# Patient Record
Sex: FEMALE | Race: BLACK OR AFRICAN AMERICAN
[De-identification: names, ages, dates, MRNs, and addresses within clinical notes are randomized per-mention and may not be internally consistent; named-entity substitution may affect disease eponyms.]

---

## 2017-04-15 NOTE — ER DOCUMENT REPORT
ED Medical Screen (RME)





- General


Chief Complaint: Chest Pain


Stated Complaint: CHEST PAIN


Notes: 


Patient is experiencing chest pain that began about 3 PM she was getting off 

from work today.  She says that the pain is easing off and now it only comes 

and goes.  She's never had anything like this before.  Feels like she has some 

acid in her chest.  It did not take any antacid, Tums, etc.  On no medications 

for acid reflux.  Eyes history of heart disease.  Denies shortness of breath.  

No fever or chills, but patient says she did have sweats at times this evening.





PMH: Hypertension, smokes.


TRAVEL OUTSIDE OF THE [place] IN LAST 30 DAYS: No





- Related Data


Allergies/Adverse Reactions: 


 





Penicillins Allergy (Intermediate, Verified 12/25/11 16:50)


 


cephalexin monohydrate [From Keflex] Adverse Reaction (Intermediate, Verified 12 /25/11 16:50)


 


ciprofloxacin [From Cipro] Adverse Reaction (Intermediate, Verified 12/25/11 16:

50)


 


ciprofloxacin HCl [From Cipro] Adverse Reaction (Intermediate, Verified 12/25/ 11 16:50)


 


Sulfa (Sulfonamide Antibiotics) Adverse Reaction (Intermediate, Verified 12/25/ 11 16:50)


 











Past Medical History





- Past Medical History


Cardiac Medical History: Reports: Hx Hypercholesterolemia, Hx Hypertension


Pulmonary Medical History: Reports: Hx Asthma, Hx Bronchitis


Renal/ Medical History: Denies: Hx Peritoneal Dialysis


Past Surgical History: Reports: Hx Tubal Ligation





- Immunizations


Hx Diphtheria, Pertussis, Tetanus Vaccination: Yes





Physical Exam





- Vital signs


Vitals: 





 











Temp Pulse Resp BP Pulse Ox


 


 98.4 F   74   16   174/93 H  96 


 


 04/15/17 18:27  04/15/17 18:27  04/15/17 18:27  04/15/17 18:27  04/15/17 18:27














Course





- Vital Signs


Vital signs: 





 











Temp Pulse Resp BP Pulse Ox


 


 98.4 F   74   16   174/93 H  96 


 


 04/15/17 18:27  04/15/17 18:27  04/15/17 18:27  04/15/17 18:27  04/15/17 18:27

## 2017-04-15 NOTE — ER DOCUMENT REPORT
ED Cardiac





- General


Chief Complaint: Chest Pain


Stated Complaint: CHEST PAIN


Notes: 


The patient is a 57-year-old female, past medical history hypertension, 

presents after 20 minutes of right lower substernal chest pain that started at 

rest.  She has never had symptoms like this in the past.  The symptoms resolved 

without any intervention.  Denies shortness of breath, back pain, nausea, 

vomiting, leg swelling, abdominal pain, cough, fevers or hemoptysis.


TRAVEL OUTSIDE OF THE [place] IN LAST 30 DAYS: No





- Related Data


Allergies/Adverse Reactions: 


 





Penicillins Allergy (Intermediate, Verified 12/25/11 16:50)


 


cephalexin monohydrate [From Keflex] Adverse Reaction (Intermediate, Verified 12 /25/11 16:50)


 


ciprofloxacin [From Cipro] Adverse Reaction (Intermediate, Verified 12/25/11 16:

50)


 


ciprofloxacin HCl [From Cipro] Adverse Reaction (Intermediate, Verified 12/25/ 11 16:50)


 


Sulfa (Sulfonamide Antibiotics) Adverse Reaction (Intermediate, Verified 12/25/ 11 16:50)


 











Past Medical History





- General


Information source: Patient





- Social History


Smoking Status: Unknown if Ever Smoked


Family History: Reviewed & Not Pertinent





- Past Medical History


Cardiac Medical History: Reports: Hx Hypercholesterolemia, Hx Hypertension


Pulmonary Medical History: Reports: Hx Asthma, Hx Bronchitis


Renal/ Medical History: Denies: Hx Peritoneal Dialysis


Past Surgical History: Reports: Hx Tubal Ligation





- Immunizations


Hx Diphtheria, Pertussis, Tetanus Vaccination: Yes





Review of Systems





- Review of Systems


Notes: 


REVIEW OF SYSTEMS:


CONSTITUTIONAL: -fevers, -chills


EENT: -eye pain, -difficulty swallowing, -nasal congestion


CARDIOVASCULAR: +chest pain, -syncope.


RESPIRATORY: -cough, -SOB


GASTROINTESTINAL: -abdominal pain, - nausea, -vomiting, -diarrhea


GENITOURINARY: -dysuria, -hematuria


MUSCULOSKELETAL: -back pain, -neck pain


SKIN: -rash or skin lesions.


HEMATOLOGIC: -easy bruising or bleeding.


LYMPHATIC: -swollen, enlarged glands.


NEUROLOGICAL: -altered mental status or loss of consciousness, -headache, -

neurologic symptoms


PSYCHIATRIC: -anxiety, -depression.


ALL OTHER SYSTEMS REVIEWED AND NEGATIVE.





Physical Exam





- Vital signs


Vitals: 


 











Temp Pulse Resp BP Pulse Ox


 


 98.4 F   74   16   174/93 H  96 


 


 04/15/17 18:27  04/15/17 18:27  04/15/17 18:27  04/15/17 18:27  04/15/17 18:27














- Notes


Notes: 


PHYSICAL EXAMINATION:





GENERAL: Well-appearing, well-nourished and in no acute distress.





HEAD: Atraumatic, normocephalic.





EYES: Pupils equal round and reactive to light, extraocular movements intact, 

sclera anicteric, conjunctiva are normal.





ENT: nares patent, oropharynx clear without exudates.  Moist mucous membranes.





NECK: Normal range of motion, supple without lymphadenopathy





LUNGS: Breath sounds clear to auscultation bilaterally and equal.  No wheezes 

rales or rhonchi.





HEART: Regular rate and rhythm without murmurs





ABDOMEN: Soft, nontender, normoactive bowel sounds.  No guarding, no rebound.  

No masses appreciated.





EXTREMITIES: Normal range of motion, no pitting or edema.  No cyanosis.





NEUROLOGICAL: Cranial nerves grossly intact.  Normal speech, normal gait.  

Normal sensory, motor, and reflex exams.





PSYCH: Normal mood, normal affect.





SKIN: Warm, Dry, normal turgor, no rashes or lesions noted.





Course





- Re-evaluation


Re-evalutation: 


Pt has HEART score 3. Two sets of troponins are negative.  Symptoms atypical 

for PE or aortic dissection at this time.  Patient's chest pain quickly 

resolved without any intervention.  Told her that she must follow-up with her 

primary care physician tomorrow for further evaluation and treatment of her 

chest pain.  Given strict return precautions and she understands.





- Vital Signs


Vital signs: 


 











Temp Pulse Resp BP Pulse Ox


 


 98.4 F   74   17   139/77 H  100 


 


 04/15/17 18:27  04/15/17 18:27  04/15/17 22:00  04/15/17 21:01  04/15/17 22:00














- Laboratory


Result Diagrams: 


 04/15/17 19:05





 04/15/17 19:05


Laboratory results interpreted by me: 


 











  04/15/17 04/15/17





  19:05 19:05


 


Seg Neuts % (Manual)  36 L 


 


Lymphocytes % (Manual)  48 H 


 


Abs Lymphs (Manual)  5.3 H 


 


Potassium   3.5 L


 


Calcium   10.6 H


 


Creatine Kinase   189 H














- Diagnostic Test


Radiology reviewed: Image reviewed, Reports reviewed





- EKG Interpretation by Me


EKG shows normal: Sinus rhythm, Axis, Intervals, QRS Complexes, ST-T Waves


Rate: Normal





Discharge





- Discharge


Clinical Impression: 


Chest pain


Qualifiers:


 Chest pain type: unspecified Qualified Code(s): R07.9 - Chest pain, unspecified





Condition: Stable


Disposition: HOME, SELF-CARE


Additional Instructions: 


CHEST PAIN OF UNCLEAR CAUSE:





     The exact cause of your chest pain isn't clear.  Fortunately, there is no 

evidence of a dangerous medical condition.  Further testing may be required to 

find the source of the pain.


     Most often, we find that this pain is coming from the chest wall -- the 

muscles or rib joints in the chest.  But chest pain can come from the lung and 

lung lining, the esophagus, the heart valves or heart lining, and even the 

stomach or gallbladder.


     Rest.  Eat lightly until the pain is gone.  We may prescribe medicine for 

pain and inflammation.


     You should call the physician immediately if the pain radiates to the 

shoulder, jaw or arms; if you start to run a fever or develop a cough; or if 

you develop shortness of breath, or other new or alarming symptoms.








NORMAL EXAM AND WORKUP:


     At this time, your examination and workup show no significant abnormality.

  No significant abnormal physical findings were noted.  All laboratory, EKG, 

and imaging (x-ray, CT scans, ultrasound) studies that were ordered show no 

significant abnormality.


     Although your examination and all studies that were ordered showed no 

significant abnormal finding, there are no examinations and no studies that are 

100% accurate.  There is always the possibility that some abnormality could 

exist and not be detected with physical examination or within the limits and 

capabilities of laboratory and other studies.


     You should return or follow up as you were instructed on your visit today 

for further evaluation if your symptoms do not resolve.








CHEST WALL PAIN:


     Your chest pain may be coming from the chest wall. This is often caused by 

straining the muscles or joints in the chest during physical activity, direct 

trauma, coughing, or vigorous vomiting.  Persons with arthritis are especially 

prone to this type of pain, due to inflammation of the cartilage joints near 

the breast bone. Occasionally, no cause can be found.


     Rest from strenuous physical activity.  This kind of chest pain is usually 

made worse by movement of the chest.  Depending on the symptoms, we may 

prescribe medicine for pain, muscle relaxation, and antiinflammatory effects.


     If the pain is new, and seems to be due to muscle strain, cold packs can 

help.  Otherwise, apply gentle warmth to the painful area for 15 minutes every 

hour or two.


     You should call contact the doctor immediately if things change. Further 

evaluation is needed if you develop a fever or cough, if the nature of the pain 

changes, or if you become short of breath.








ANGINA EPISODE:


     Your physician has diagnosed the pain you experienced as an episode of 

angina.  Angina occurs when a portion of the heart muscle temporarily lacks 

oxygen.  It does not cause any permanent heart damage, but serves as a warning.


     Hospitalization is not necessary now.  Evaluation of your cardiac condition

, and medical therapy for angina will be necessary.  It's important you be sure 

to keep all appointments and take medication exactly as prescribed.


     Angina is usually treated with a type of "nitrate" medication. This is 

available as ointment, pills, or sublingual (under the tongue) tablets.  

Depending on your clinical situation, other medications may be added to help 

control angina.  These may include beta blockers or calcium blockers.


     If episodes of angina are occurring with increased frequency, or if chest 

pain lasts longer than 15 minutes or does not respond to nitroglycerin, you 

must seek emergency medical care immediately.








ACID REFLUX DISEASE (GERD):


     Gastro-Esophageal Reflux Disease (GERD) is caused by stomach acid 

refluxing back up into the esophagus. The valve at the end of the esophagus may 

be weak. This is common in persons with a hiatal hernia. GERD symptoms can 

include indigestion, chest pain, heartburn, or food "sticking." Certain foods, 

alcohol, and aspirin can make GERD worse.


     Treatment depends on the severity. Usually, antacids or acid-suppressing 

medicines are used. When the esophagus is acutely inflamed, the physician will 

often prescribe membrane-protective drugs such as Carafate.  Some patients 

benefit from medication such as Reglan that tightens the valve at the top of 

the stomach.


     Avoid those foods that bring on your symptoms. For many people, these 

foods are coffee, chocolate, onions, garlic, and carbonated drinks. Don't use 

alcohol, aspirin, caffeine, or tobacco. Don't eat late at night -- within 4 

hours of bedtime. Don't over-eat. If necessary, elevate the head of your bed 

about 4 inches so that stomach acid will not roll up into your esophagus.


     Call the doctor if you develop severe chest pain, inability to swallow 

fluids, fever, or worsening symptoms.








FOLLOW-UP CARE:


If you have been referred to a physician for follow-up care, call the physician

s office for an appointment as you were instructed or within the next two days.

  If you experience worsening or a significant change in your symptoms, notify 

the physician immediately or return to the Emergency Department at any time for 

re-evaluation.


Referrals: 


REESE ZAFAR MD [[facility]] - Follow up as needed

## 2018-06-22 NOTE — RADIOLOGY REPORT (SQ)
EXAM DESCRIPTION:  CHEST PA/LATERAL



COMPLETED DATE/TIME:  6/22/2018 12:04 pm



REASON FOR STUDY:  R06.2 Wheezing; J45.20 Mild intermittent asthma, uncomplicated



COMPARISON:  4/15/2017.



EXAM PARAMETERS:  NUMBER OF VIEWS: two views

TECHNIQUE: Digital Frontal and Lateral radiographic views of the chest acquired.

RADIATION DOSE: NA

LIMITATIONS: none



FINDINGS:  LUNGS AND PLEURA: No acute infiltrates or effusions.

MEDIASTINUM AND HILAR STRUCTURES: No masses or contour abnormalities.

HEART AND VASCULAR STRUCTURES: The heart is normal.  The pulmonary vasculature is normal.

BONES: No acute findings.

HARDWARE: None in the chest.

OTHER: No other significant finding.



IMPRESSION:  NO ACUTE DISEASE.



TECHNICAL DOCUMENTATION:  JOB ID:  [id number]

[id number]

 2011 [employer]- All Rights Reserved



Reading location - IP/workstation name: [email or web address]

## 2020-07-29 NOTE — RADIOLOGY REPORT (SQ)
EXAM DESCRIPTION:  NM PARATHYROID IMAGING



IMAGES COMPLETED DATE/TIME:  7/29/2020 11:21 am



REASON FOR STUDY:  HYPERPARATHYROIDISM (E21.3) E21.3  HYPERPARATHYROIDISM, UNSPECIFIED



COMPARISON:  None.



RADIONUCLIDE AND DOSE:  20 and millicuries Tc-99m Sestamibi.

The route of agent administration: Intravenous



ADDITIONAL DRUGS AND DOSES:  None.



TECHNIQUE:  Early and delayed images of the neck acquired following radionuclide administration.



LIMITATIONS:  None.



FINDINGS:  Thyroid: Normal size.  Homogeneous activity.  Normal washout.  No focal lesions.

Parathyroid: There is retained activity in the region of the lower pole of each thyroid gland on the 
delayed images.

Other: No other significant findings.



IMPRESSION:  Cannot exclude parathyroid glands associated with the lower pole of each lobe of the thy
roid.



TECHNICAL DOCUMENTATION:  JOB ID:  [id number]

 2011 [employer]- All Rights Reserved



Reading location - IP/workstation name: [email or web address]

## 2020-10-02 ENCOUNTER — HOSPITAL ENCOUNTER (OUTPATIENT)
Dept: HOSPITAL 62 - RAD | Age: 60
End: 2020-10-02
Attending: SURGERY
Payer: COMMERCIAL

## 2020-10-02 DIAGNOSIS — E07.9: ICD-10-CM

## 2020-10-02 DIAGNOSIS — E21.3: Primary | ICD-10-CM

## 2020-10-02 PROCEDURE — 70491 CT SOFT TISSUE NECK W/DYE: CPT

## 2020-10-02 PROCEDURE — 82565 ASSAY OF CREATININE: CPT

## 2020-10-03 NOTE — RADIOLOGY REPORT (SQ)
EXAM DESCRIPTION:  CT SOFT TISSUE NECK WITH



IMAGES COMPLETED DATE/TIME:  10/2/2020 12:50 pm



REASON FOR STUDY:  (E21.3)HYPERPARATHYROIDISM, UNSPECIFIED E21.3  HYPERPARATHYROIDISM, UNSPECIFIED E0
7.9  DISORDER OF THYROID, UNSPECIFIED.



COMPARISON:  Nuclear medicine parathyroid scan, 7/28/2020.



TECHNIQUE:  Post IV contrasted scanning from skull base through lung apices with review of bone, soft
 tissue and lung windows.  Reconstructed coronal and sagittal MPR images reviewed.  All images stored
 on PACS.

All CT scanners at this facility use dose modulation, iterative reconstruction, and/or weight based d
osing when appropriate to reduce radiation dose to as low as reasonably achievable (ALARA).

[facility]: Dose Right  [facility]: CareDose    [facility]: Dose Right    [facility]: Teradose 4D    [facility]: Smart Technologies



CONTRAST TYPE AND DOSE:  contrast/concentration: Isovue 300.00 mmol/ml; Total Contrast Delivered: 75.
0 ml; Total Saline Delivered: 75.0 ml



RENAL FUNCTION:  GFR > 60.



RADIATION DOSE:   .



LIMITATIONS:  None.



FINDINGS:  SKULL BASE: Intact.

MAJOR SALIVARY GLANDS: No solid or cystic masses.  No inflammatory changes.

LYMPHADENOPATHY: No adenopathy.

MUCOSAL MASSES OR ASYMMETRY: No mucosal masses or asymmetry.

LARYNX/CORDS: No abnormal findings.

VASCULAR STRUCTURES: The major vessels are patent.

LUNG APICES: Mild pulmonary emphysema.

BONES: Intact.

THYROID: Thyroid has normal size and appearance.  No thyroid nodules greater than 1 cm.  No evidence 
of arterially enhancing thyroid mass.  No mediastinal mass or mass at the tracheoesophageal groove.

PARANASAL SINUSES: Diffuse mucosal thickening and debris in the left maxillary sinus with associated 
sinus wall buttressing consistent with chronic sinusitis.  Remaining paranasal sinuses are clear.

OTHER: No other significant finding.



IMPRESSION:  No CT evidence of parathyroid adenoma.



TECHNICAL DOCUMENTATION:  JOB ID:  [id number]

Quality ID # [id number]: Final reports with documentation of one or more dose reduction techniques (e.g., Au
tomated exposure control, adjustment of the mA and/or kV according to patient size, use of iterative 
reconstruction technique)

 2011 [employer]- All Rights Reserved



Reading location - IP/workstation name: [email or web address]

## 2020-10-13 ENCOUNTER — HOSPITAL ENCOUNTER (OUTPATIENT)
Dept: HOSPITAL 62 - OD | Age: 60
End: 2020-10-13
Attending: PHYSICIAN ASSISTANT
Payer: COMMERCIAL

## 2020-10-13 DIAGNOSIS — E21.3: Primary | ICD-10-CM

## 2020-10-13 PROCEDURE — 36415 COLL VENOUS BLD VENIPUNCTURE: CPT

## 2020-10-13 PROCEDURE — 82310 ASSAY OF CALCIUM: CPT

## 2020-10-13 PROCEDURE — 83970 ASSAY OF PARATHORMONE: CPT
